# Patient Record
Sex: FEMALE | Race: WHITE | NOT HISPANIC OR LATINO | Employment: UNEMPLOYED | URBAN - NONMETROPOLITAN AREA
[De-identification: names, ages, dates, MRNs, and addresses within clinical notes are randomized per-mention and may not be internally consistent; named-entity substitution may affect disease eponyms.]

---

## 2017-03-22 ENCOUNTER — TRANSCRIBE ORDERS (OUTPATIENT)
Dept: ADMINISTRATIVE | Facility: HOSPITAL | Age: 77
End: 2017-03-22

## 2017-03-22 DIAGNOSIS — Z13.820 SCREENING FOR OSTEOPOROSIS: ICD-10-CM

## 2017-03-22 DIAGNOSIS — Z12.31 VISIT FOR SCREENING MAMMOGRAM: Primary | ICD-10-CM

## 2017-04-07 ENCOUNTER — HOSPITAL ENCOUNTER (OUTPATIENT)
Dept: MAMMOGRAPHY | Facility: HOSPITAL | Age: 77
Discharge: HOME | End: 2017-04-07
Attending: INTERNAL MEDICINE | Admitting: INTERNAL MEDICINE

## 2017-04-07 ENCOUNTER — HOSPITAL ENCOUNTER (OUTPATIENT)
Dept: BONE DENSITY | Facility: HOSPITAL | Age: 77
Discharge: HOME | End: 2017-04-07
Attending: INTERNAL MEDICINE

## 2017-04-07 DIAGNOSIS — Z13.820 SCREENING FOR OSTEOPOROSIS: ICD-10-CM

## 2017-04-07 DIAGNOSIS — Z12.31 VISIT FOR SCREENING MAMMOGRAM: ICD-10-CM

## 2017-04-07 PROCEDURE — G0202 SCR MAMMO BI INCL CAD: HCPCS

## 2017-04-07 PROCEDURE — 77063 BREAST TOMOSYNTHESIS BI: CPT

## 2017-04-07 PROCEDURE — 77080 DXA BONE DENSITY AXIAL: CPT | Performed by: RADIOLOGY

## 2017-04-07 PROCEDURE — 77080 DXA BONE DENSITY AXIAL: CPT

## 2017-04-07 PROCEDURE — 77063 BREAST TOMOSYNTHESIS BI: CPT | Performed by: RADIOLOGY

## 2017-04-07 PROCEDURE — G0202 SCR MAMMO BI INCL CAD: HCPCS | Performed by: RADIOLOGY

## 2023-03-06 ENCOUNTER — APPOINTMENT (OUTPATIENT)
Dept: CT IMAGING | Facility: HOSPITAL | Age: 83
End: 2023-03-06
Payer: MEDICARE

## 2023-03-06 ENCOUNTER — APPOINTMENT (OUTPATIENT)
Dept: GENERAL RADIOLOGY | Facility: HOSPITAL | Age: 83
End: 2023-03-06
Payer: MEDICARE

## 2023-03-06 ENCOUNTER — HOSPITAL ENCOUNTER (EMERGENCY)
Facility: HOSPITAL | Age: 83
Discharge: NURSING, REHAB OR LONG-TERM CARE | End: 2023-03-07
Attending: STUDENT IN AN ORGANIZED HEALTH CARE EDUCATION/TRAINING PROGRAM | Admitting: EMERGENCY MEDICINE
Payer: MEDICARE

## 2023-03-06 DIAGNOSIS — S42.035A CLOSED NONDISPLACED FRACTURE OF ACROMIAL END OF LEFT CLAVICLE, INITIAL ENCOUNTER: Primary | ICD-10-CM

## 2023-03-06 LAB
ALBUMIN SERPL-MCNC: 3.7 G/DL (ref 3.5–5.2)
ALBUMIN/GLOB SERPL: 1.3 G/DL
ALP SERPL-CCNC: 96 U/L (ref 39–117)
ALT SERPL W P-5'-P-CCNC: 14 U/L (ref 1–33)
ANION GAP SERPL CALCULATED.3IONS-SCNC: 7.8 MMOL/L (ref 5–15)
AST SERPL-CCNC: 27 U/L (ref 1–32)
BASOPHILS # BLD AUTO: 0.08 10*3/MM3 (ref 0–0.2)
BASOPHILS NFR BLD AUTO: 0.8 % (ref 0–1.5)
BILIRUB SERPL-MCNC: 0.4 MG/DL (ref 0–1.2)
BILIRUB UR QL STRIP: NEGATIVE
BUN SERPL-MCNC: 19 MG/DL (ref 8–23)
BUN/CREAT SERPL: 23.5 (ref 7–25)
CALCIUM SPEC-SCNC: 9.4 MG/DL (ref 8.6–10.5)
CHLORIDE SERPL-SCNC: 103 MMOL/L (ref 98–107)
CLARITY UR: CLEAR
CO2 SERPL-SCNC: 24.2 MMOL/L (ref 22–29)
COLOR UR: YELLOW
CREAT SERPL-MCNC: 0.81 MG/DL (ref 0.57–1)
DEPRECATED RDW RBC AUTO: 41.1 FL (ref 37–54)
EGFRCR SERPLBLD CKD-EPI 2021: 71.7 ML/MIN/1.73
EOSINOPHIL # BLD AUTO: 0.22 10*3/MM3 (ref 0–0.4)
EOSINOPHIL NFR BLD AUTO: 2.3 % (ref 0.3–6.2)
ERYTHROCYTE [DISTWIDTH] IN BLOOD BY AUTOMATED COUNT: 12.5 % (ref 12.3–15.4)
GEN 5 2HR TROPONIN T REFLEX: 11 NG/L
GLOBULIN UR ELPH-MCNC: 2.9 GM/DL
GLUCOSE SERPL-MCNC: 95 MG/DL (ref 65–99)
GLUCOSE UR STRIP-MCNC: NEGATIVE MG/DL
HCT VFR BLD AUTO: 33.9 % (ref 34–46.6)
HGB BLD-MCNC: 11.9 G/DL (ref 12–15.9)
HGB UR QL STRIP.AUTO: NEGATIVE
HOLD SPECIMEN: NORMAL
HOLD SPECIMEN: NORMAL
IMM GRANULOCYTES # BLD AUTO: 0.03 10*3/MM3 (ref 0–0.05)
IMM GRANULOCYTES NFR BLD AUTO: 0.3 % (ref 0–0.5)
KETONES UR QL STRIP: ABNORMAL
LEUKOCYTE ESTERASE UR QL STRIP.AUTO: NEGATIVE
LYMPHOCYTES # BLD AUTO: 2.08 10*3/MM3 (ref 0.7–3.1)
LYMPHOCYTES NFR BLD AUTO: 21.4 % (ref 19.6–45.3)
MAGNESIUM SERPL-MCNC: 1.7 MG/DL (ref 1.6–2.4)
MCH RBC QN AUTO: 31.7 PG (ref 26.6–33)
MCHC RBC AUTO-ENTMCNC: 35.1 G/DL (ref 31.5–35.7)
MCV RBC AUTO: 90.4 FL (ref 79–97)
MONOCYTES # BLD AUTO: 0.65 10*3/MM3 (ref 0.1–0.9)
MONOCYTES NFR BLD AUTO: 6.7 % (ref 5–12)
NEUTROPHILS NFR BLD AUTO: 6.68 10*3/MM3 (ref 1.7–7)
NEUTROPHILS NFR BLD AUTO: 68.5 % (ref 42.7–76)
NITRITE UR QL STRIP: NEGATIVE
NRBC BLD AUTO-RTO: 0 /100 WBC (ref 0–0.2)
PH UR STRIP.AUTO: 5.5 [PH] (ref 5–8)
PLATELET # BLD AUTO: 247 10*3/MM3 (ref 140–450)
PMV BLD AUTO: 9 FL (ref 6–12)
POTASSIUM SERPL-SCNC: 4.7 MMOL/L (ref 3.5–5.2)
PROT SERPL-MCNC: 6.6 G/DL (ref 6–8.5)
PROT UR QL STRIP: NEGATIVE
RBC # BLD AUTO: 3.75 10*6/MM3 (ref 3.77–5.28)
SODIUM SERPL-SCNC: 135 MMOL/L (ref 136–145)
SP GR UR STRIP: 1.02 (ref 1–1.03)
TROPONIN T DELTA: -2 NG/L
TROPONIN T SERPL HS-MCNC: 13 NG/L
TSH SERPL DL<=0.05 MIU/L-ACNC: 32.73 UIU/ML (ref 0.27–4.2)
UROBILINOGEN UR QL STRIP: ABNORMAL
WBC NRBC COR # BLD: 9.74 10*3/MM3 (ref 3.4–10.8)
WHOLE BLOOD HOLD COAG: NORMAL
WHOLE BLOOD HOLD SPECIMEN: NORMAL

## 2023-03-06 PROCEDURE — 84443 ASSAY THYROID STIM HORMONE: CPT | Performed by: PHYSICIAN ASSISTANT

## 2023-03-06 PROCEDURE — 84484 ASSAY OF TROPONIN QUANT: CPT | Performed by: PHYSICIAN ASSISTANT

## 2023-03-06 PROCEDURE — 81003 URINALYSIS AUTO W/O SCOPE: CPT | Performed by: PHYSICIAN ASSISTANT

## 2023-03-06 PROCEDURE — P9612 CATHETERIZE FOR URINE SPEC: HCPCS

## 2023-03-06 PROCEDURE — 73060 X-RAY EXAM OF HUMERUS: CPT

## 2023-03-06 PROCEDURE — 71045 X-RAY EXAM CHEST 1 VIEW: CPT

## 2023-03-06 PROCEDURE — 93010 ELECTROCARDIOGRAM REPORT: CPT | Performed by: INTERNAL MEDICINE

## 2023-03-06 PROCEDURE — 85025 COMPLETE CBC W/AUTO DIFF WBC: CPT | Performed by: PHYSICIAN ASSISTANT

## 2023-03-06 PROCEDURE — 73030 X-RAY EXAM OF SHOULDER: CPT

## 2023-03-06 PROCEDURE — 36415 COLL VENOUS BLD VENIPUNCTURE: CPT

## 2023-03-06 PROCEDURE — 99284 EMERGENCY DEPT VISIT MOD MDM: CPT

## 2023-03-06 PROCEDURE — 83735 ASSAY OF MAGNESIUM: CPT | Performed by: PHYSICIAN ASSISTANT

## 2023-03-06 PROCEDURE — 72125 CT NECK SPINE W/O DYE: CPT

## 2023-03-06 PROCEDURE — 72125 CT NECK SPINE W/O DYE: CPT | Performed by: RADIOLOGY

## 2023-03-06 PROCEDURE — 80053 COMPREHEN METABOLIC PANEL: CPT | Performed by: PHYSICIAN ASSISTANT

## 2023-03-06 PROCEDURE — 93005 ELECTROCARDIOGRAM TRACING: CPT | Performed by: PHYSICIAN ASSISTANT

## 2023-03-06 PROCEDURE — 70450 CT HEAD/BRAIN W/O DYE: CPT | Performed by: RADIOLOGY

## 2023-03-06 PROCEDURE — 70450 CT HEAD/BRAIN W/O DYE: CPT

## 2023-03-06 RX ORDER — SODIUM CHLORIDE 0.9 % (FLUSH) 0.9 %
10 SYRINGE (ML) INJECTION AS NEEDED
Status: DISCONTINUED | OUTPATIENT
Start: 2023-03-06 | End: 2023-03-06

## 2023-03-06 NOTE — ED NOTES
State guardian Marisol ENCARNACION  with after hour  Guardianship for the state of [place] gave consistent to treat

## 2023-03-07 VITALS
TEMPERATURE: 98 F | HEART RATE: 62 BPM | DIASTOLIC BLOOD PRESSURE: 66 MMHG | HEIGHT: 62 IN | WEIGHT: 92 LBS | SYSTOLIC BLOOD PRESSURE: 119 MMHG | BODY MASS INDEX: 16.93 KG/M2 | RESPIRATION RATE: 16 BRPM | OXYGEN SATURATION: 100 %

## 2023-03-07 LAB
QT INTERVAL: 426 MS
QTC INTERVAL: 479 MS

## 2023-03-07 NOTE — ED PROVIDER NOTES
Subjective   History of Present Illness  82-year-old female presents secondary to fall at nursing home.  Patient states that she fell out of her wheelchair yesterday.  She states that she does hurt in her left shoulder.  She denies head injury.  She denies neck injury.  Patient does provide a somewhat unreliable history.  She denies any chest pain pressure tightness or squeezing.  She denies any abdominal pain.  She denies any arm or leg injury/complaint.  She was apparently seen in [place] yesterday.  This nursing home staff had reported that she was sent back within 45 minutes and was told nothing was wrong.  They obtained a chest x-ray which showed a clavicle fracture.  She was sent here for further evaluation and treatment.        Review of Systems   Constitutional: Negative.  Negative for fever.   HENT: Negative.    Respiratory: Negative.    Cardiovascular: Negative.  Negative for chest pain.   Gastrointestinal: Negative.  Negative for abdominal pain.   Endocrine: Negative.    Genitourinary: Negative.  Negative for dysuria.   Skin: Negative.    Neurological: Negative.    Psychiatric/Behavioral: Negative.    All other systems reviewed and are negative.      No past medical history on file.    No Known Allergies    No past surgical history on file.    No family history on file.    Social History     Socioeconomic History   • Marital status:            Objective   Physical Exam  Vitals and nursing note reviewed.   Constitutional:       General: She is not in acute distress.     Appearance: She is well-developed. She is not diaphoretic.   HENT:      Head: Normocephalic and atraumatic.      Right Ear: External ear normal.      Left Ear: External ear normal.      Nose: Nose normal.   Eyes:      Conjunctiva/sclera: Conjunctivae normal.      Pupils: Pupils are equal, round, and reactive to light.   Neck:      Vascular: No JVD.      Trachea: No tracheal deviation.   Cardiovascular:      Rate and Rhythm:  Normal rate and regular rhythm.      Heart sounds: Normal heart sounds. No murmur heard.  Pulmonary:      Effort: Pulmonary effort is normal. No respiratory distress.      Breath sounds: Normal breath sounds. No wheezing.   Abdominal:      General: Bowel sounds are normal.      Palpations: Abdomen is soft.      Tenderness: There is no abdominal tenderness.   Musculoskeletal:         General: No deformity. Normal range of motion.      Cervical back: Normal range of motion and neck supple.   Skin:     General: Skin is warm and dry.      Coloration: Skin is not pale.      Findings: No erythema or rash.   Neurological:      Mental Status: She is alert and oriented to person, place, and time.      Cranial Nerves: No cranial nerve deficit.   Psychiatric:         Behavior: Behavior normal.         Thought Content: Thought content normal.         Procedures           ED Course  ED Course as of 03/07/23 1923   Mon Mar 06, 2023   1931 ECG 12 Lead Altered Mental Status  Sinus rhythm  Regular rate 76  QRS 84 QTc 479 no acute ischemia.  Electronically signed by Gavi Romero DO, 03/06/23, 7:31 PM EST.   [LK]   Tue Mar 07, 2023   0005 XR Shoulder 2+ View Left  IMPRESSION:  Nondisplaced fracture through the distal diaphysis of the left clavicle. [ES]      ED Course User Index  [ES] Jarad Askew MD  [LK] Gavi Romero DO                                           Medical Decision Making      Final diagnoses:   Closed nondisplaced fracture of acromial end of left clavicle, initial encounter       ED Disposition  ED Disposition     ED Disposition   Discharge    Condition   Stable    Comment   --             Janessa Rahman MD  [address] [place] [address] [id number] [phone]    Schedule an appointment as soon as possible for a visit in 1 day  EVALUATE         Medication List      No changes were made to your prescriptions during this visit.          Tony Mkceon PA  03/07/23 1923

## 2023-03-21 ENCOUNTER — TELEPHONE (OUTPATIENT)
Dept: ORTHOPEDIC SURGERY | Facility: CLINIC | Age: 83
End: 2023-03-21

## 2023-03-21 NOTE — TELEPHONE ENCOUNTER
Hub staff attempted to follow warm transfer process and was unsuccessful    Caller: JOHN    Relationship to patient: [facility]    Best call back number: [phone]    Patient is needing: WENT TO [facility] ON 03.16.23 FOR LEFT FOOT FRACTURE. NEEDING TO FOLLOW UP WITH ORTHO. WILL HAVE [facility] GET XRAY REPORT FAXED OVER ASAP.

## 2023-03-22 ENCOUNTER — TELEPHONE (OUTPATIENT)
Dept: ORTHOPEDIC SURGERY | Facility: CLINIC | Age: 83
End: 2023-03-22
Payer: MEDICARE

## 2023-03-22 NOTE — TELEPHONE ENCOUNTER
Returned call to nursing home and spoke to the nurses station. I gave them my email address to send the link for the xray images.

## 2023-03-22 NOTE — TELEPHONE ENCOUNTER
Provider: RC  Caller: JOHN  Relationship to Patient: NURSE  Pharmacy:   Phone Number: [phone]  Reason for Call: NURSING HOME STATES THAT ONE OF HER XRAYS THAT NEEDED TO FAXED OVER WAS FROM A MOBILE XRAY AND IT WOULD HAVE TO BE MAILED FROM [place] UNLESS THEY CAN SEND A LINK VIA EMAIL TO BE ABLE TO VIEW THEM THAT WAY BEFORE MONDAY

## 2023-03-24 DIAGNOSIS — M79.672 LEFT FOOT PAIN: Primary | ICD-10-CM

## 2023-03-30 DIAGNOSIS — M79.672 FOOT PAIN, LEFT: Primary | ICD-10-CM

## 2023-03-31 ENCOUNTER — OFFICE VISIT (OUTPATIENT)
Dept: ORTHOPEDIC SURGERY | Facility: CLINIC | Age: 83
End: 2023-03-31
Payer: MEDICARE

## 2023-03-31 VITALS — HEIGHT: 62 IN | BODY MASS INDEX: 16.92 KG/M2 | WEIGHT: 91.93 LBS

## 2023-03-31 DIAGNOSIS — S92.315A NONDISPLACED FRACTURE OF FIRST METATARSAL BONE, LEFT FOOT, INITIAL ENCOUNTER FOR CLOSED FRACTURE: ICD-10-CM

## 2023-03-31 DIAGNOSIS — S92.335A NONDISPLACED FRACTURE OF THIRD METATARSAL BONE, LEFT FOOT, INITIAL ENCOUNTER FOR CLOSED FRACTURE: Primary | ICD-10-CM

## 2023-03-31 DIAGNOSIS — S92.514A NONDISPLACED FRACTURE OF PROXIMAL PHALANX OF RIGHT LESSER TOE(S), INITIAL ENCOUNTER FOR CLOSED FRACTURE: ICD-10-CM

## 2023-03-31 PROCEDURE — 99203 OFFICE O/P NEW LOW 30 MIN: CPT | Performed by: PHYSICIAN ASSISTANT

## 2023-03-31 RX ORDER — FOLIC ACID 1 MG/1
1 TABLET ORAL DAILY
COMMUNITY

## 2023-03-31 RX ORDER — LOSARTAN POTASSIUM 100 MG/1
TABLET ORAL
COMMUNITY
Start: 2023-03-16

## 2023-03-31 RX ORDER — MEMANTINE HYDROCHLORIDE 10 MG/1
TABLET ORAL
COMMUNITY
Start: 2023-02-20

## 2023-03-31 RX ORDER — DONEPEZIL HYDROCHLORIDE 10 MG/1
TABLET, FILM COATED ORAL
COMMUNITY
Start: 2023-03-16

## 2023-03-31 RX ORDER — LANOLIN ALCOHOL/MO/W.PET/CERES
325 CREAM (GRAM) TOPICAL
COMMUNITY

## 2023-03-31 RX ORDER — RISPERIDONE 0.25 MG/1
TABLET ORAL
COMMUNITY
Start: 2023-03-13

## 2023-03-31 RX ORDER — MIRTAZAPINE 15 MG/1
TABLET, FILM COATED ORAL
COMMUNITY
Start: 2023-03-30

## 2023-03-31 RX ORDER — PHENOL 1.4 %
1 AEROSOL, SPRAY (ML) MUCOUS MEMBRANE 2 TIMES DAILY
COMMUNITY

## 2023-03-31 RX ORDER — HYDROCODONE BITARTRATE AND ACETAMINOPHEN 5; 325 MG/1; MG/1
TABLET ORAL
COMMUNITY
Start: 2023-03-17

## 2023-03-31 RX ORDER — SODIUM CHLORIDE 9 MG/ML
INJECTION, SOLUTION INTRAVENOUS
COMMUNITY
Start: 2023-03-17

## 2023-03-31 RX ORDER — PRENATAL VIT 27,CALC/IRON/FA 60 MG-1 MG
1 TABLET ORAL DAILY
COMMUNITY
Start: 2022-10-15

## 2023-03-31 RX ORDER — AMLODIPINE BESYLATE 5 MG/1
TABLET ORAL
COMMUNITY
Start: 2023-03-26

## 2023-03-31 RX ORDER — VALPROIC ACID 250 MG/5ML
250 SOLUTION ORAL
COMMUNITY
Start: 2022-10-15 | End: 2023-10-15

## 2023-03-31 RX ORDER — LEVOTHYROXINE SODIUM 0.2 MG/1
TABLET ORAL
COMMUNITY
Start: 2023-03-28

## 2023-03-31 RX ORDER — POLYETHYLENE GLYCOL 3350 17 G/17G
17 POWDER, FOR SOLUTION ORAL DAILY
COMMUNITY

## 2023-03-31 RX ORDER — MEROPENEM 1 G/1
INJECTION, POWDER, FOR SOLUTION INTRAVENOUS
COMMUNITY
Start: 2023-03-17

## 2023-03-31 RX ORDER — SENNA PLUS 8.6 MG/1
1 TABLET ORAL
COMMUNITY

## 2023-03-31 RX ORDER — BENZTROPINE MESYLATE 1 MG/1
TABLET ORAL
COMMUNITY
Start: 2023-03-30

## 2023-03-31 RX ORDER — CARVEDILOL 25 MG/1
TABLET ORAL
COMMUNITY
Start: 2023-02-20

## 2023-03-31 RX ORDER — SPIRONOLACTONE 25 MG/1
TABLET ORAL
COMMUNITY
Start: 2023-02-20

## 2023-03-31 RX ORDER — PAROXETINE 10 MG/1
TABLET, FILM COATED ORAL
COMMUNITY
Start: 2023-03-11

## 2023-03-31 NOTE — PROGRESS NOTES
[facility] [place] Orthopaedic Surgery New Patient Visit          Patient: Amrita Gunter  YOB: 1940  Date of Encounter: 03/31/2023  PCP: Janessa Rahman MD      Subjective     Chief Complaint   Patient presents with   • Left Foot - Initial Evaluation           History of Present Illness:     Amrita Gunter is a 82 y.o. female presents today as result of left foot injury.  Patient is a nursing home resident and unable to communicate and majority of the history is provided with the nursing staff that is present at time of visit today.  They are unsure of the exact date of injury and the aide stated to notice patient complaining of pain with transfers over the last several days.  Patient is nonambulatory and minimally verbal.  She complains of pain to the left foot indeterminately.  Denies any specific injury.  She presents with radiographs outside source that reveals evidence of acute fracture of the base of the third proximal phalanx and for poor irregularity at the neck of the third metatarsal as well as a radiolucency at the base of the first metatarsal suggestive of an acute nondisplaced fracture.        There is no problem list on file for this patient.    Past Medical History:   Diagnosis Date   • Alzheimer disease    • Anxiety disorder    • Aphasia    • Dysphagia    • Heart failure    • Hypertensive heart disease    • Hypothyroidism    • Paranoid schizophrenia      History reviewed. No pertinent surgical history.  Social History     Occupational History   • Not on file   Tobacco Use   • Smoking status: Former     Types: Cigarettes   • Smokeless tobacco: Current     Types: Snuff   Substance and Sexual Activity   • Alcohol use: Never   • Drug use: Defer   • Sexual activity: Defer    Amrita Gunter  reports that she has quit smoking. Her smoking use included cigarettes. Her smokeless tobacco use includes snuff.. I have educated her on the risk of diseases from using tobacco products such  as cancer, COPD and heart disease.             Social History     Social History Narrative   • Not on file     History reviewed. No pertinent family history.  Current Outpatient Medications   Medication Sig Dispense Refill   • amLODIPine (NORVASC) 5 MG tablet      • benztropine (COGENTIN) 1 MG tablet      • calcium carbonate (OS-JENNIFER) 600 MG tablet Take 1 tablet by mouth 2 (Two) Times a Day.     • carvedilol (COREG) 25 MG tablet      • donepezil (ARICEPT) 10 MG tablet      • ferrous sulfate 325 (65 FE) MG EC tablet Take 1 tablet by mouth.     • folic acid (FOLVITE) 1 MG tablet Take 1 tablet by mouth Daily.     • HYDROcodone-acetaminophen (NORCO) 5-325 MG per tablet      • levothyroxine (SYNTHROID, LEVOTHROID) 200 MCG tablet      • losartan (COZAAR) 100 MG tablet      • memantine (NAMENDA) 10 MG tablet      • meropenem (MERREM) 1 g injection      • mirtazapine (REMERON) 15 MG tablet      • PARoxetine (PAXIL) 10 MG tablet      • polyethylene glycol (MIRALAX) 17 GM/SCOOP powder Take 17 g by mouth Daily.     • Prenatal Vit-Fe Fumarate-FA (Trinatal Rx 1) 60-1 MG tablet 1 tablet by Nasogastric route Daily.     • risperiDONE (risperDAL) 0.25 MG tablet      • senna (SENOKOT) 8.6 MG tablet Take 1 tablet by mouth.     • sodium chloride 0.9 % solution      • spironolactone (ALDACTONE) 25 MG tablet      • Valproic Acid (DEPAKENE) 250 MG/5ML solution syrup Take 5 mL by mouth.       No current facility-administered medications for this visit.     No Known Allergies         Review of Systems   Unable to perform ROS: patient nonverbal (remainder of the ROS reviewed by NH aide)   Constitutional: Negative.   HENT: Negative.    Eyes: Negative.    Cardiovascular: Negative.    Respiratory: Negative.    Endocrine: Negative.    Hematologic/Lymphatic: Negative.    Skin: Negative.    Musculoskeletal:        Pertinent positives listed in HPI   Gastrointestinal: Negative.    Genitourinary: Negative.    Neurological: Negative.   "  Psychiatric/Behavioral: Negative.    Allergic/Immunologic: Negative.          Objective      Vitals:    03/31/23 0928   Weight: 41.7 kg (91 lb 14.9 oz)   Height: 157.5 cm (62.01\")      BMI is below normal parameters (malnutrition). Recommendations: referral to primary care      Physical Exam  Vitals and nursing note reviewed.   Constitutional:       General: She is not in acute distress.     Appearance: She is not ill-appearing.   HENT:      Head: Normocephalic and atraumatic.      Right Ear: External ear normal.      Left Ear: External ear normal.      Nose: Nose normal. No congestion or rhinorrhea.   Eyes:      Extraocular Movements: Extraocular movements intact.      Conjunctiva/sclera: Conjunctivae normal.      Pupils: Pupils are equal, round, and reactive to light.   Cardiovascular:      Rate and Rhythm: Normal rate.      Pulses: Normal pulses.   Pulmonary:      Effort: Pulmonary effort is normal. No respiratory distress.      Breath sounds: No stridor.   Abdominal:      General: There is no distension.   Musculoskeletal:      Cervical back: Normal range of motion.      Comments: Left foot on examination reveals mild generalized swelling with ecchymosis to the dorsum of the left foot.  There is limited range of motion secondary to notable fractures.  No gross abnormality or rotational abnormalities.  Neurovascular status grossly intact left lower extremity   Skin:     General: Skin is warm and dry.      Capillary Refill: Capillary refill takes less than 2 seconds.   Neurological:      General: No focal deficit present.      Mental Status: She is alert and oriented to person, place, and time.   Psychiatric:         Mood and Affect: Mood normal.         Behavior: Behavior normal.         Thought Content: Thought content normal.         Judgment: Judgment normal.                 Radiology:        Radiographs 3 views left foot from outside source 3/16/23 reveals acute fracture of the base of the third proximal " phalanx and for poor irregularity at the neck of the third metatarsal as well as a radiolucency at the base of the first metatarsal suggestive of an acute nondisplaced fracture.          Assessment/Plan        ICD-10-CM ICD-9-CM   1. Nondisplaced fracture of third metatarsal bone, left foot, initial encounter for closed fracture  S92.335A 825.25   2. Nondisplaced fracture of proximal phalanx of right lesser toe(s), initial encounter for closed fracture  S92.514A 826.0   3. Nondisplaced fracture of first metatarsal bone, left foot, initial encounter for closed fracture  S92.315A 825.25       82-year-old female with age-indeterminate left foot third metatarsal and proximal phalanx fracture as well as first metatarsal nondisplaced fracture.  Patient is nonambulatory and struggles with simple tasks and following instructions secondary to decreased mental acuity and nonverbal status.  As result of this the patient was provided with a equalizer boot to which she will wear the majority the time with exceptions for removal for bathing purposes.  Patient will continue nonweightbearing status and she will return back in 4 weeks for repeat radiographs and further evaluation/periosteal callus check.                  This document was signed by Duane Willingham PA-C March 31, 2023    CC: Janessa Rahman MD      Dictated Utilizing Dragon Dictation:   Please note that portions of this note were completed with a voice recognition program.   Part of this note may be an electronic transcription/translation of spoken language to printed text using the Dragon Dictation System.

## 2023-04-28 DIAGNOSIS — S92.335A NONDISPLACED FRACTURE OF THIRD METATARSAL BONE, LEFT FOOT, INITIAL ENCOUNTER FOR CLOSED FRACTURE: Primary | ICD-10-CM

## 2023-05-18 ENCOUNTER — OFFICE VISIT (OUTPATIENT)
Dept: ORTHOPEDIC SURGERY | Facility: CLINIC | Age: 83
End: 2023-05-18
Payer: MEDICARE

## 2023-05-18 ENCOUNTER — HOSPITAL ENCOUNTER (OUTPATIENT)
Dept: GENERAL RADIOLOGY | Facility: HOSPITAL | Age: 83
Discharge: HOME | End: 2023-05-18
Payer: MEDICARE

## 2023-05-18 VITALS — BODY MASS INDEX: 16.92 KG/M2 | WEIGHT: 91.93 LBS | HEIGHT: 62 IN

## 2023-05-18 DIAGNOSIS — S92.515D CLOSED NONDISPLACED FRACTURE OF PROXIMAL PHALANX OF LESSER TOE OF LEFT FOOT WITH ROUTINE HEALING, SUBSEQUENT ENCOUNTER: ICD-10-CM

## 2023-05-18 DIAGNOSIS — S92.335A NONDISPLACED FRACTURE OF THIRD METATARSAL BONE, LEFT FOOT, INITIAL ENCOUNTER FOR CLOSED FRACTURE: ICD-10-CM

## 2023-05-18 DIAGNOSIS — S92.335A NONDISPLACED FRACTURE OF THIRD METATARSAL BONE, LEFT FOOT, INITIAL ENCOUNTER FOR CLOSED FRACTURE: Primary | ICD-10-CM

## 2023-05-18 PROCEDURE — 73630 X-RAY EXAM OF FOOT: CPT

## 2023-05-18 NOTE — PROGRESS NOTES
[facility] [place] Orthopaedic Surgery Established Patient Visit          Patient: Amrita Gunter  YOB: 1940  Date of Encounter: 5/18/2023  PCP: Janessa Rahman MD      Subjective     Chief Complaint   Patient presents with   • Left Foot - Fracture, Follow-up           History of Present Illness:     Amrita Gunter is a 82 y.o. female presents today as result of left foot injury.  Patient is a nursing home resident and unable to communicate and majority of the history is provided with the nursing staff that is present at time of visit today.  They are unsure of the exact date of injury and the aide stated to notice patient complaining of pain with transfers several days before last visit.  Patient was found as suffering a third metatarsal and proximal phalanx fracture nondisplaced.  Patient has been immobilized in equalizer boot since last office visit.  There has been no significant complications.  Patient presents today for repeat radiographs and further evaluation.       There is no problem list on file for this patient.    Past Medical History:   Diagnosis Date   • Alzheimer disease    • Anxiety disorder    • Aphasia    • Dysphagia    • Heart failure    • Hypertensive heart disease    • Hypothyroidism    • Paranoid schizophrenia      History reviewed. No pertinent surgical history.  Social History     Occupational History   • Not on file   Tobacco Use   • Smoking status: Former     Types: Cigarettes   • Smokeless tobacco: Current     Types: Snuff   Substance and Sexual Activity   • Alcohol use: Never   • Drug use: Defer   • Sexual activity: Defer    Amrita Gunter  reports that she has quit smoking. Her smoking use included cigarettes. Her smokeless tobacco use includes snuff.. I have educated her on the risk of diseases from using tobacco products such as cancer, COPD and heart disease.             Social History     Social History Narrative   • Not on file     History reviewed. No  pertinent family history.  Current Outpatient Medications   Medication Sig Dispense Refill   • amLODIPine (NORVASC) 5 MG tablet      • benztropine (COGENTIN) 1 MG tablet      • calcium carbonate (OS-JENNIFER) 600 MG tablet Take 1 tablet by mouth 2 (Two) Times a Day.     • carvedilol (COREG) 25 MG tablet      • donepezil (ARICEPT) 10 MG tablet      • ferrous sulfate 325 (65 FE) MG EC tablet Take 1 tablet by mouth.     • folic acid (FOLVITE) 1 MG tablet Take 1 tablet by mouth Daily.     • HYDROcodone-acetaminophen (NORCO) 5-325 MG per tablet      • levothyroxine (SYNTHROID, LEVOTHROID) 200 MCG tablet      • losartan (COZAAR) 100 MG tablet      • memantine (NAMENDA) 10 MG tablet      • meropenem (MERREM) 1 g injection      • mirtazapine (REMERON) 15 MG tablet      • PARoxetine (PAXIL) 10 MG tablet      • polyethylene glycol (MIRALAX) 17 GM/SCOOP powder Take 17 g by mouth Daily.     • Prenatal Vit-Fe Fumarate-FA (Trinatal Rx 1) 60-1 MG tablet 1 tablet by Nasogastric route Daily.     • risperiDONE (risperDAL) 0.25 MG tablet      • senna (SENOKOT) 8.6 MG tablet Take 1 tablet by mouth.     • sodium chloride 0.9 % solution      • spironolactone (ALDACTONE) 25 MG tablet      • Valproic Acid (DEPAKENE) 250 MG/5ML solution syrup Take 5 mL by mouth.       No current facility-administered medications for this visit.     No Known Allergies         Review of Systems   Unable to perform ROS: patient nonverbal (remainder of the ROS reviewed by NH aide)   Constitutional: Negative.   HENT: Negative.    Eyes: Negative.    Cardiovascular: Negative.    Respiratory: Negative.    Endocrine: Negative.    Hematologic/Lymphatic: Negative.    Skin: Negative.    Musculoskeletal:        Pertinent positives listed in HPI   Gastrointestinal: Negative.    Genitourinary: Negative.    Neurological: Negative.    Psychiatric/Behavioral: Negative.    Allergic/Immunologic: Negative.          Objective      Vitals:    05/18/23 1053   Weight: 41.7 kg (91 lb  "14.9 oz)   Height: 157.5 cm (62.01\")      BMI is below normal parameters (malnutrition). Recommendations: referral to primary care      Physical Exam  Vitals and nursing note reviewed.   Constitutional:       General: She is not in acute distress.     Appearance: She is not ill-appearing.   HENT:      Head: Normocephalic and atraumatic.      Right Ear: External ear normal.      Left Ear: External ear normal.      Nose: Nose normal. No congestion or rhinorrhea.   Eyes:      Extraocular Movements: Extraocular movements intact.      Conjunctiva/sclera: Conjunctivae normal.      Pupils: Pupils are equal, round, and reactive to light.   Cardiovascular:      Rate and Rhythm: Normal rate.      Pulses: Normal pulses.   Pulmonary:      Effort: Pulmonary effort is normal. No respiratory distress.      Breath sounds: No stridor.   Abdominal:      General: There is no distension.   Musculoskeletal:      Cervical back: Normal range of motion.      Comments: Left foot on examination reveals no swelling with resolution of previous ecchymosis to the dorsum of the left foot.  There is limited range of motion secondary to notable fractures.  No gross abnormality or rotational abnormalities.  Neurovascular status grossly intact left lower extremity   Skin:     General: Skin is warm and dry.      Capillary Refill: Capillary refill takes less than 2 seconds.   Neurological:      General: No focal deficit present.      Mental Status: She is alert and oriented to person, place, and time.   Psychiatric:         Mood and Affect: Mood normal.         Behavior: Behavior normal.         Thought Content: Thought content normal.         Judgment: Judgment normal.                 Radiology:        Radiographs 3 views left foot from outside source 3/16/23 reveals acute fracture of the base of the third proximal phalanx and for poor irregularity at the neck of the third metatarsal as well as a radiolucency at the base of the first metatarsal " suggestive of an acute nondisplaced fracture.    XR Foot 3+ View Left    Result Date: 5/18/2023  1. Demineralization 2.  No acute fracture or dislocation.  This report was finalized on 5/18/2023 12:28 PM by Dr. Genaro Nuñez MD.            Assessment/Plan        ICD-10-CM ICD-9-CM   1. Nondisplaced fracture of third metatarsal bone, left foot, initial encounter for closed fracture  S92.335A 825.25   2. Closed nondisplaced fracture of proximal phalanx of lesser toe of left foot with routine healing, subsequent encounter  S92.515D V54.19       82-year-old female with age-indeterminate left foot third metatarsal and proximal phalanx fracture as well as first metatarsal nondisplaced fracture with notable healing.  Patient is nonambulatory and struggles with simple tasks and following instructions secondary to decreased mental acuity and nonverbal status.  As result of this the patient was discontinued from the boot with the bony healing and consolidation.  She may return to normal daily activity and will return back on an as-needed basis upon any further complication or worsening or return of pain or symptoms.                   This document was signed by Duane Willingham PA-C May 18, 2023    CC: Janessa Rahman MD      Dictated Utilizing Dragon Dictation:   Please note that portions of this note were completed with a voice recognition program.   Part of this note may be an electronic transcription/translation of spoken language to printed text using the Dragon Dictation System.